# Patient Record
Sex: FEMALE | Race: OTHER
[De-identification: names, ages, dates, MRNs, and addresses within clinical notes are randomized per-mention and may not be internally consistent; named-entity substitution may affect disease eponyms.]

---

## 2019-01-23 ENCOUNTER — HOSPITAL ENCOUNTER (EMERGENCY)
Dept: HOSPITAL 74 - JERFT | Age: 24
Discharge: HOME | End: 2019-01-23
Payer: COMMERCIAL

## 2019-01-23 VITALS — BODY MASS INDEX: 28.5 KG/M2

## 2019-01-23 VITALS — SYSTOLIC BLOOD PRESSURE: 118 MMHG | HEART RATE: 79 BPM | TEMPERATURE: 98.6 F | DIASTOLIC BLOOD PRESSURE: 81 MMHG

## 2019-01-23 DIAGNOSIS — H10.33: ICD-10-CM

## 2019-01-23 DIAGNOSIS — H57.12: Primary | ICD-10-CM

## 2019-01-23 NOTE — PDOC
History of Present Illness





- General


Chief Complaint: Eye Problem


Stated Complaint: EYE PROBLEM


Time Seen by Provider: 01/23/19 12:30





- History of Present Illness


Initial Comments: 





01/23/19 13:38


23-year-old female without comorbidities presents for evaluation of bilateral 

eye irritation left greater than right 3 weeks.





Past History





- Past Medical History


Allergies/Adverse Reactions: 


 Allergies











Allergy/AdvReac Type Severity Reaction Status Date / Time


 


amoxicillin Allergy   Verified 08/18/18 13:43











Home Medications: 


Ambulatory Orders





Tobramycin 0.3% Ophth Soln [Tobrex Ophthalmic Solution -] 1 drop OU Q4HWA #1 

bottle 01/23/19 








COPD: No


Hypercholesterolemia: No





- Surgical History


Cholecystectomy: No





- Immunization History


Immunization Up to Date: No





- Suicide/Smoking/Psychosocial Hx


Smoking History: Never smoked


Have you smoked in the past 12 months: No


Number of Cigarettes Smoked Daily: 1


Information on smoking cessation initiated: No


Hx Alcohol Use: No


Drug/Substance Use Hx: No





**Review of Systems





- Review of Systems


HEENTM: Yes: See HPI, Eye Pain, Tearing





*Physical Exam





- Vital Signs


 Last Vital Signs











Temp Pulse Resp BP Pulse Ox


 


 98.6 F   79   18   118/81   100 


 


 01/23/19 12:31  01/23/19 12:31  01/23/19 12:31  01/23/19 12:31  01/23/19 12:31














- Physical Exam


Comments: 





01/23/19 13:39


HEAD: NC/AT


EYES: Conjuntiva erythemic left greater than right crusting on the left 

eyelashes


MS: Full ROM in all joints without edema 


NEUROLOGIC: No gross sensory or motor deficits, NVID


SKIN: Normal color and temperature no lesions or rashes





Moderate Sedation





- Procedure Monitoring


Vital Signs: 


Procedure Monitoring Vital Signs











Temperature  98.6 F   01/23/19 12:31


 


Pulse Rate  79   01/23/19 12:31


 


Respiratory Rate  18   01/23/19 12:31


 


Blood Pressure  118/81   01/23/19 12:31


 


O2 Sat by Pulse Oximetry (%)  100   01/23/19 12:31











*DC/Admit/Observation/Transfer


Diagnosis at time of Disposition: 


 Left eye pain, Acute conjunctivitis, bilateral








- Discharge Dispostion


Disposition: HOME


Condition at time of disposition: Stable


Decision to Admit order: No





- Prescriptions


Prescriptions: 


Tobramycin 0.3% Ophth Soln [Tobrex Ophthalmic Solution -] 1 drop OU Q4HWA #1 

bottle





- Referrals


Referrals: 


Reyna Christensen MD [Staff Physician] - 


Maged Palumbo MD [Non Staff, Medical] - 


Odell Dempsey [Non Staff, Medical] - 


Camacho Gramajo DO [Non Staff, Medical] - 


Tucker Gonzalez MD [Non Staff, Medical] - 





- Patient Instructions


Printed Discharge Instructions:  Conjunctivitis


Additional Instructions: 


Please use the antibiotics as directed. Return to the emergency room should 

symptoms worsen or go unresolved. Please follow-up with ophthalmology in one to 

2 days for further evaluation and treatment options.





- Post Discharge Activity

## 2019-01-23 NOTE — PDOC
Rapid Medical Evaluation


Time Seen by Provider: 01/23/19 12:30


Medical Evaluation: 


 Allergies











Allergy/AdvReac Type Severity Reaction Status Date / Time


 


amoxicillin Allergy   Verified 08/18/18 13:43











01/23/19 12:30


I performed a brief in-person evaluation of this patient.


Chief complaint is: Left eye pain


Pertinent physical exam findings include: Left sclera/conjunctiva injected


I have ordered the following: None





Patient will proceed to the ED for further evaluation.





**Discharge Disposition





- Diagnosis


 Left eye pain








- Referrals





- Patient Instructions





- Post Discharge Activity